# Patient Record
Sex: FEMALE | Race: WHITE | Employment: FULL TIME | URBAN - METROPOLITAN AREA
[De-identification: names, ages, dates, MRNs, and addresses within clinical notes are randomized per-mention and may not be internally consistent; named-entity substitution may affect disease eponyms.]

---

## 2018-02-09 ENCOUNTER — HOSPITAL ENCOUNTER (EMERGENCY)
Age: 23
Discharge: HOME | End: 2018-02-09
Attending: EMERGENCY MEDICINE
Payer: COMMERCIAL

## 2018-02-09 VITALS
TEMPERATURE: 98 F | DIASTOLIC BLOOD PRESSURE: 84 MMHG | RESPIRATION RATE: 18 BRPM | HEART RATE: 100 BPM | WEIGHT: 140 LBS | SYSTOLIC BLOOD PRESSURE: 136 MMHG | OXYGEN SATURATION: 99 %

## 2018-02-09 DIAGNOSIS — Z88.9 DRUG ALLERGY: Primary | ICD-10-CM

## 2018-02-09 PROCEDURE — 99283 EMERGENCY DEPT VISIT LOW MDM: CPT

## 2018-02-09 RX ORDER — PREDNISONE 20 MG/1
60 TABLET ORAL DAILY
Qty: 12 TABLET | Refills: 0 | Status: SHIPPED | OUTPATIENT
Start: 2018-02-09 | End: 2018-02-11

## 2018-02-09 RX ORDER — PREDNISONE 20 MG/1
60 TABLET ORAL ONCE
Status: COMPLETED | OUTPATIENT
Start: 2018-02-09 | End: 2018-02-09

## 2018-02-09 RX ORDER — AMOXICILLIN 875 MG/1
875 TABLET, COATED ORAL 2 TIMES DAILY
COMMUNITY

## 2018-02-10 NOTE — ED PROVIDER NOTES
Patient Seen in: [facility] Emergency Department In [place]    History   Patient presents with:  Rash Skin Problem (integumentary)    Stated Complaint: rash all over body.      HPI    51-year-old female presents to the emergency department complaining of a to display    ED Course as of Feb 09 2016  ------------------------------------------------------------  Exam findings and treatment plan were discussed. 60 mg of prednisone was administered prior to discharge.   The patient did take a dose of loratadine t

## 2018-02-10 NOTE — ED INITIAL ASSESSMENT (HPI)
Rash onset yesterday. Pt denies dff breathing or swallowing. Pt states she started on amoxicillin for sinus infection last Wednesday.

## 2019-09-11 ENCOUNTER — IMAGING SERVICES (OUTPATIENT)
Dept: GENERAL RADIOLOGY | Age: 24
End: 2019-09-11

## (undated) NOTE — ED AVS SNAPSHOT
Corie Levi   MRN: [id number]    Department:  THE [facility] Emergency Department in [place]   Date of Visit:  2/9/2018           Disclosure     Insurance plans vary and the physician(s) referred by the ER may not be covered by your plan.  Please contact y tell this physician (or your personal doctor if your instructions are to return to your personal doctor) about any new or lasting problems. The primary care or specialist physician will see patients referred from the [facility] Emergency Department.  Geo López